# Patient Record
Sex: MALE | Race: WHITE | NOT HISPANIC OR LATINO | Employment: OTHER | URBAN - METROPOLITAN AREA
[De-identification: names, ages, dates, MRNs, and addresses within clinical notes are randomized per-mention and may not be internally consistent; named-entity substitution may affect disease eponyms.]

---

## 2024-09-27 ENCOUNTER — HOSPITAL ENCOUNTER (EMERGENCY)
Facility: HOSPITAL | Age: 64
Discharge: HOME | End: 2024-09-27
Payer: MEDICARE

## 2024-09-27 VITALS
HEART RATE: 56 BPM | DIASTOLIC BLOOD PRESSURE: 91 MMHG | SYSTOLIC BLOOD PRESSURE: 196 MMHG | TEMPERATURE: 98.2 F | RESPIRATION RATE: 13 BRPM | OXYGEN SATURATION: 97 %

## 2024-09-27 DIAGNOSIS — G51.0 BELL'S PALSY: Primary | ICD-10-CM

## 2024-09-27 LAB
ALBUMIN SERPL BCG-MCNC: 4 G/DL (ref 3.5–5)
ALP SERPL-CCNC: 101 U/L (ref 34–104)
ALT SERPL W P-5'-P-CCNC: 69 U/L (ref 7–52)
ANION GAP SERPL CALCULATED.3IONS-SCNC: 6 MMOL/L (ref 4–13)
AST SERPL W P-5'-P-CCNC: 76 U/L (ref 13–39)
ATRIAL RATE: 66 BPM
B BURGDOR IGG+IGM SER QL IA: NEGATIVE
BASOPHILS # BLD AUTO: 0.1 THOUSANDS/ΜL (ref 0–0.1)
BASOPHILS NFR BLD AUTO: 1 % (ref 0–1)
BILIRUB SERPL-MCNC: 0.98 MG/DL (ref 0.2–1)
BUN SERPL-MCNC: 23 MG/DL (ref 5–25)
CALCIUM SERPL-MCNC: 8.8 MG/DL (ref 8.4–10.2)
CHLORIDE SERPL-SCNC: 107 MMOL/L (ref 96–108)
CO2 SERPL-SCNC: 25 MMOL/L (ref 21–32)
CREAT SERPL-MCNC: 1.21 MG/DL (ref 0.6–1.3)
EOSINOPHIL # BLD AUTO: 0.25 THOUSAND/ΜL (ref 0–0.61)
EOSINOPHIL NFR BLD AUTO: 3 % (ref 0–6)
ERYTHROCYTE [DISTWIDTH] IN BLOOD BY AUTOMATED COUNT: 14.8 % (ref 11.6–15.1)
GFR SERPL CREATININE-BSD FRML MDRD: 62 ML/MIN/1.73SQ M
GLUCOSE SERPL-MCNC: 95 MG/DL (ref 65–140)
HCT VFR BLD AUTO: 47.3 % (ref 36.5–49.3)
HGB BLD-MCNC: 16.5 G/DL (ref 12–17)
IMM GRANULOCYTES # BLD AUTO: 0.02 THOUSAND/UL (ref 0–0.2)
IMM GRANULOCYTES NFR BLD AUTO: 0 % (ref 0–2)
LYMPHOCYTES # BLD AUTO: 2.76 THOUSANDS/ΜL (ref 0.6–4.47)
LYMPHOCYTES NFR BLD AUTO: 32 % (ref 14–44)
MCH RBC QN AUTO: 33.4 PG (ref 26.8–34.3)
MCHC RBC AUTO-ENTMCNC: 34.9 G/DL (ref 31.4–37.4)
MCV RBC AUTO: 96 FL (ref 82–98)
MONOCYTES # BLD AUTO: 0.96 THOUSAND/ΜL (ref 0.17–1.22)
MONOCYTES NFR BLD AUTO: 11 % (ref 4–12)
NEUTROPHILS # BLD AUTO: 4.45 THOUSANDS/ΜL (ref 1.85–7.62)
NEUTS SEG NFR BLD AUTO: 53 % (ref 43–75)
NRBC BLD AUTO-RTO: 0 /100 WBCS
P AXIS: 0 DEGREES
PLATELET # BLD AUTO: 145 THOUSANDS/UL (ref 149–390)
PMV BLD AUTO: 10.2 FL (ref 8.9–12.7)
POTASSIUM SERPL-SCNC: 3.8 MMOL/L (ref 3.5–5.3)
PR INTERVAL: 192 MS
PROT SERPL-MCNC: 7.8 G/DL (ref 6.4–8.4)
QRS AXIS: 81 DEGREES
QRSD INTERVAL: 76 MS
QT INTERVAL: 406 MS
QTC INTERVAL: 425 MS
RBC # BLD AUTO: 4.94 MILLION/UL (ref 3.88–5.62)
SODIUM SERPL-SCNC: 138 MMOL/L (ref 135–147)
T WAVE AXIS: 2 DEGREES
VENTRICULAR RATE: 66 BPM
WBC # BLD AUTO: 8.54 THOUSAND/UL (ref 4.31–10.16)

## 2024-09-27 PROCEDURE — 85025 COMPLETE CBC W/AUTO DIFF WBC: CPT | Performed by: NURSE PRACTITIONER

## 2024-09-27 PROCEDURE — 93005 ELECTROCARDIOGRAM TRACING: CPT

## 2024-09-27 PROCEDURE — 99284 EMERGENCY DEPT VISIT MOD MDM: CPT

## 2024-09-27 PROCEDURE — 93010 ELECTROCARDIOGRAM REPORT: CPT | Performed by: INTERNAL MEDICINE

## 2024-09-27 PROCEDURE — 99284 EMERGENCY DEPT VISIT MOD MDM: CPT | Performed by: NURSE PRACTITIONER

## 2024-09-27 PROCEDURE — 80053 COMPREHEN METABOLIC PANEL: CPT | Performed by: NURSE PRACTITIONER

## 2024-09-27 PROCEDURE — 36415 COLL VENOUS BLD VENIPUNCTURE: CPT | Performed by: NURSE PRACTITIONER

## 2024-09-27 PROCEDURE — 96374 THER/PROPH/DIAG INJ IV PUSH: CPT

## 2024-09-27 PROCEDURE — 86618 LYME DISEASE ANTIBODY: CPT | Performed by: NURSE PRACTITIONER

## 2024-09-27 RX ORDER — PREDNISONE 20 MG/1
60 TABLET ORAL DAILY
Qty: 21 TABLET | Refills: 0 | Status: SHIPPED | OUTPATIENT
Start: 2024-09-27 | End: 2024-10-04

## 2024-09-27 RX ORDER — METHYLPREDNISOLONE SODIUM SUCCINATE 125 MG/2ML
125 INJECTION, POWDER, LYOPHILIZED, FOR SOLUTION INTRAMUSCULAR; INTRAVENOUS ONCE
Status: COMPLETED | OUTPATIENT
Start: 2024-09-27 | End: 2024-09-27

## 2024-09-27 RX ADMIN — DEXTRAN 70, GLYCERIN, HYPROMELLOSE 1 DROP: 1; 2; 3 SOLUTION/ DROPS OPHTHALMIC at 10:57

## 2024-09-27 RX ADMIN — METHYLPREDNISOLONE SODIUM SUCCINATE 125 MG: 125 INJECTION, POWDER, FOR SOLUTION INTRAMUSCULAR; INTRAVENOUS at 10:31

## 2024-09-27 NOTE — ED PROVIDER NOTES
Final diagnoses:   Bell's palsy     ED Disposition       ED Disposition   Discharge    Condition   Stable    Date/Time   Fri Sep 27, 2024 11:13 AM    Comment   Leonidas Hutson discharge to home/self care.                   Assessment & Plan       Medical Decision Making  Symptoms consistent with Bell's palsy involving the brow and mouth.  No findings that would suggest CVA, no hemiparesis or weakness of the extremities.  No aphasia or visual deficits.  Significant paralysis recommend that he follow-up with ophthalmology to protect his eye and establish a primary care provider.  Counseled about hypertension recommend decreasing table salt to start.  Lyme titer pending will treat accordingly once resulted.    Amount and/or Complexity of Data Reviewed  Labs: ordered.    Risk  OTC drugs.  Prescription drug management.             Medications   methylPREDNISolone sodium succinate (Solu-MEDROL) injection 125 mg (125 mg Intravenous Given 9/27/24 1031)   Artificial Tears Op Soln 1 drop (1 drop Right Eye Given 9/27/24 1057)       ED Risk Strat Scores                                               History of Present Illness       Chief Complaint   Patient presents with    Facial Droop     Pt presents with facial droop and swelling for 2 days, no other neurological decifits per patient, no numbness or tingling, equal strength bilaterally, ambulatory to triage       History reviewed. No pertinent past medical history.   History reviewed. No pertinent surgical history.   History reviewed. No pertinent family history.   Social History     Tobacco Use    Smoking status: Every Day     Current packs/day: 0.50     Types: Cigarettes    Smokeless tobacco: Never      E-Cigarette/Vaping      E-Cigarette/Vaping Substances      I have reviewed and agree with the history as documented.     64-year-old male patient presenting here today with a chief complaint of facial paralysis.  He woke up about 2 days ago when symptoms were mild but they  have progressed to nearly complete paralysis of the right side of his face involving his forehead brow and mouth.  He has the inability to completely close his right eye.  He denies any fever or chills.  He denies any trauma.          Review of Systems   Constitutional:  Negative for chills and fever.   HENT:  Negative for ear pain and sore throat.    Eyes:  Negative for pain and visual disturbance.   Respiratory:  Negative for cough and shortness of breath.    Cardiovascular:  Negative for chest pain and palpitations.   Gastrointestinal:  Negative for abdominal pain and vomiting.   Genitourinary:  Negative for dysuria and hematuria.   Musculoskeletal:  Negative for arthralgias and back pain.   Skin:  Negative for color change and rash.   Neurological:  Positive for facial asymmetry. Negative for seizures and syncope.   All other systems reviewed and are negative.          Objective       ED Triage Vitals [09/27/24 1004]   Temperature Pulse Blood Pressure Respirations SpO2 Patient Position - Orthostatic VS   98.2 °F (36.8 °C) 68 (!) 258/123 16 95 % Sitting      Temp Source Heart Rate Source BP Location FiO2 (%) Pain Score    Oral Monitor Left arm -- --      Vitals      Date and Time Temp Pulse SpO2 Resp BP Pain Score FACES Pain Rating User   09/27/24 1045 -- 56 97 % 13 196/91 -- -- KM   09/27/24 1030 -- 59 97 % 9 227/106 -- -- RAP   09/27/24 1021 -- -- -- -- 240/114 -- -- [facility]   09/27/24 1004 98.2 °F (36.8 °C) 68 95 % 16 258/123 -- -- MS            Physical Exam  Vitals and nursing note reviewed.   Constitutional:       General: He is not in acute distress.     Appearance: He is well-developed.   HENT:      Head: Normocephalic and atraumatic.      Nose: No rhinorrhea.   Eyes:      General:         Right eye: No discharge.         Left eye: No discharge.      Extraocular Movements: EOM normal.      Conjunctiva/sclera: Conjunctivae normal.   Cardiovascular:      Rate and Rhythm: Normal rate.   Pulmonary:      Effort:  Pulmonary effort is normal. No respiratory distress.   Abdominal:      General: There is no distension.      Tenderness: There is no guarding.   Musculoskeletal:         General: No deformity or edema.      Cervical back: Normal range of motion and neck supple.   Skin:     General: Skin is warm and dry.      Coloration: Skin is not pale.   Neurological:      Mental Status: He is alert and oriented to person, place, and time.      Cranial Nerves: Facial asymmetry present.      Motor: No weakness.      Coordination: Coordination normal.      Comments: House-Brackmann score of V   Psychiatric:         Mood and Affect: Mood and affect and mood normal.         Results Reviewed       Procedure Component Value Units Date/Time    Comprehensive metabolic panel [[id number]]  (Abnormal) Collected: 09/27/24 1027    Lab Status: Final result Specimen: Blood from Arm, Right Updated: 09/27/24 1057     Sodium 138 mmol/L      Potassium 3.8 mmol/L      Chloride 107 mmol/L      CO2 25 mmol/L      ANION GAP 6 mmol/L      BUN 23 mg/dL      Creatinine 1.21 mg/dL      Glucose 95 mg/dL      Calcium 8.8 mg/dL      AST 76 U/L      ALT 69 U/L      Alkaline Phosphatase 101 U/L      Total Protein 7.8 g/dL      Albumin 4.0 g/dL      Total Bilirubin 0.98 mg/dL      eGFR 62 ml/min/1.73sq m     Narrative:      [employer] guidelines for Chronic Kidney Disease (CKD):     Stage 1 with normal or high GFR (GFR > 90 mL/min/1.73 square meters)    Stage 2 Mild CKD (GFR = 60-89 mL/min/1.73 square meters)    Stage 3A Moderate CKD (GFR = 45-59 mL/min/1.73 square meters)    Stage 3B Moderate CKD (GFR = 30-44 mL/min/1.73 square meters)    Stage 4 Severe CKD (GFR = 15-29 mL/min/1.73 square meters)    Stage 5 End Stage CKD (GFR <15 mL/min/1.73 square meters)  Note: GFR calculation is accurate only with a steady state creatinine    CBC and differential [[id number]]  (Abnormal) Collected: 09/27/24 1027    Lab Status: Final result Specimen:  Blood from Arm, Right Updated: 09/27/24 1038     WBC 8.54 Thousand/uL      RBC 4.94 Million/uL      Hemoglobin 16.5 g/dL      Hematocrit 47.3 %      MCV 96 fL      MCH 33.4 pg      MCHC 34.9 g/dL      RDW 14.8 %      MPV 10.2 fL      Platelets 145 Thousands/uL      nRBC 0 /100 WBCs      Segmented % 53 %      Immature Grans % 0 %      Lymphocytes % 32 %      Monocytes % 11 %      Eosinophils Relative 3 %      Basophils Relative 1 %      Absolute Neutrophils 4.45 Thousands/µL      Absolute Immature Grans 0.02 Thousand/uL      Absolute Lymphocytes 2.76 Thousands/µL      Absolute Monocytes 0.96 Thousand/µL      Eosinophils Absolute 0.25 Thousand/µL      Basophils Absolute 0.10 Thousands/µL     LYME TOTAL AB W REFLEX TO IGM/IGG [[id number]] Collected: 09/27/24 1027    Lab Status: In process Specimen: Blood from Arm, Right Updated: 09/27/24 1035    Narrative:      The following orders were created for panel order LYME TOTAL AB W REFLEX TO IGM/IGG.  Procedure                               Abnormality         Status                     ---------                               -----------         ------                     Lyme Total AB W Reflex t...[[id number]]                      In process                   Please view results for these tests on the individual orders.    Lyme Total AB W Reflex to IGM/IGG [[id number]] Collected: 09/27/24 1027    Lab Status: In process Specimen: Blood from Arm, Right Updated: 09/27/24 1035            No orders to display       Procedures    ED Medication and Procedure Management   None     Patient's Medications   Discharge Prescriptions    PREDNISONE 20 MG TABLET    Take 3 tablets (60 mg total) by mouth daily for 7 days       Start Date: 9/27/2024 End Date: 10/4/2024       Order Dose: 60 mg       Quantity: 21 tablet    Refills: 0     No discharge procedures on file.  ED SEPSIS DOCUMENTATION   Time reflects when diagnosis was documented in both MDM as applicable and the Disposition within this  note       Time User Action Codes Description Comment    9/27/2024 11:13 AM Victorino Stern Add [G51.0] Bell's palsy                  KIKA Borden  09/27/24 1124

## 2025-01-01 ENCOUNTER — APPOINTMENT (EMERGENCY)
Dept: CT IMAGING | Facility: HOSPITAL | Age: 65
DRG: 432 | End: 2025-01-01
Payer: MEDICARE

## 2025-01-01 ENCOUNTER — APPOINTMENT (INPATIENT)
Dept: NON INVASIVE DIAGNOSTICS | Facility: HOSPITAL | Age: 65
DRG: 432 | End: 2025-01-01
Attending: PHYSICIAN ASSISTANT
Payer: MEDICARE

## 2025-01-01 ENCOUNTER — APPOINTMENT (INPATIENT)
Dept: RADIOLOGY | Facility: HOSPITAL | Age: 65
DRG: 432 | End: 2025-01-01
Payer: MEDICARE

## 2025-01-01 ENCOUNTER — APPOINTMENT (INPATIENT)
Dept: CT IMAGING | Facility: HOSPITAL | Age: 65
DRG: 432 | End: 2025-01-01
Payer: MEDICARE

## 2025-01-01 ENCOUNTER — APPOINTMENT (INPATIENT)
Dept: NON INVASIVE DIAGNOSTICS | Facility: HOSPITAL | Age: 65
DRG: 432 | End: 2025-01-01
Payer: MEDICARE

## 2025-01-01 ENCOUNTER — APPOINTMENT (INPATIENT)
Dept: ULTRASOUND IMAGING | Facility: HOSPITAL | Age: 65
DRG: 432 | End: 2025-01-01
Payer: MEDICARE

## 2025-01-01 ENCOUNTER — HOSPITAL ENCOUNTER (INPATIENT)
Facility: HOSPITAL | Age: 65
LOS: 7 days | DRG: 432 | End: 2025-08-15
Attending: EMERGENCY MEDICINE | Admitting: FAMILY MEDICINE
Payer: MEDICARE

## 2025-01-01 ENCOUNTER — APPOINTMENT (EMERGENCY)
Dept: RADIOLOGY | Facility: HOSPITAL | Age: 65
DRG: 432 | End: 2025-01-01
Payer: MEDICARE

## 2025-08-08 PROBLEM — E87.20 ACIDOSIS: Status: ACTIVE | Noted: 2025-01-01

## 2025-08-08 PROBLEM — E72.20 HYPERAMMONEMIA (HCC): Status: ACTIVE | Noted: 2025-01-01

## 2025-08-08 PROBLEM — K74.60 CIRRHOSIS (HCC): Status: ACTIVE | Noted: 2025-01-01

## 2025-08-08 PROBLEM — R79.89 ELEVATED LFTS: Status: ACTIVE | Noted: 2025-01-01

## 2025-08-08 PROBLEM — N17.9 ACUTE KIDNEY INJURY (HCC): Status: ACTIVE | Noted: 2025-01-01

## 2025-08-08 PROBLEM — I44.1 HEART BLOCK AV SECOND DEGREE: Status: ACTIVE | Noted: 2025-01-01

## 2025-08-08 PROBLEM — R10.9 ABDOMINAL PAIN: Status: ACTIVE | Noted: 2025-01-01

## 2025-08-08 PROBLEM — F17.210 CIGARETTE NICOTINE DEPENDENCE: Status: ACTIVE | Noted: 2025-01-01

## 2025-08-08 PROBLEM — E87.1 HYPONATREMIA: Status: ACTIVE | Noted: 2025-01-01

## 2025-08-09 PROBLEM — K65.2 SBP (SPONTANEOUS BACTERIAL PERITONITIS) (HCC): Status: ACTIVE | Noted: 2025-01-01

## 2025-08-09 PROBLEM — E87.20 ACIDOSIS: Status: RESOLVED | Noted: 2025-01-01 | Resolved: 2025-01-01

## 2025-08-12 PROBLEM — D69.6 THROMBOCYTOPENIA (HCC): Status: ACTIVE | Noted: 2025-01-01

## 2025-08-12 PROBLEM — R59.9 ENLARGED LYMPH NODES: Status: ACTIVE | Noted: 2025-01-01

## 2025-08-12 PROBLEM — D58.9 HEMOLYTIC ANEMIA (HCC): Status: ACTIVE | Noted: 2025-01-01

## 2025-08-13 PROBLEM — Z51.5 PALLIATIVE CARE ENCOUNTER: Status: ACTIVE | Noted: 2025-01-01

## 2025-08-13 PROBLEM — Z71.89 GOALS OF CARE, COUNSELING/DISCUSSION: Status: ACTIVE | Noted: 2025-01-01

## 2025-08-14 PROBLEM — D68.9 COAGULOPATHY (HCC): Status: ACTIVE | Noted: 2025-01-01

## 2025-08-14 PROBLEM — E87.1 HYPONATREMIA: Status: RESOLVED | Noted: 2025-01-01 | Resolved: 2025-01-01

## 2025-08-14 PROBLEM — E87.29 METABOLIC ACIDOSIS, INCREASED ANION GAP: Status: ACTIVE | Noted: 2025-01-01

## 2025-08-14 PROBLEM — J96.01 ACUTE RESPIRATORY FAILURE WITH HYPOXIA (HCC): Status: ACTIVE | Noted: 2025-01-01

## 2025-08-14 PROBLEM — R59.9 ENLARGED LYMPH NODES: Status: RESOLVED | Noted: 2025-01-01 | Resolved: 2025-01-01

## 2025-08-14 PROBLEM — D58.9 HEMOLYTIC ANEMIA (HCC): Status: RESOLVED | Noted: 2025-01-01 | Resolved: 2025-01-01

## 2025-08-14 PROBLEM — Z51.5 COMFORT MEASURES ONLY STATUS: Status: ACTIVE | Noted: 2025-01-01

## 2025-08-14 PROBLEM — E87.20 ACIDOSIS: Status: RESOLVED | Noted: 2025-01-01 | Resolved: 2025-01-01

## 2025-08-14 PROBLEM — K65.2 SBP (SPONTANEOUS BACTERIAL PERITONITIS) (HCC): Status: RESOLVED | Noted: 2025-01-01 | Resolved: 2025-01-01

## 2025-08-14 PROBLEM — K76.82 HEPATIC ENCEPHALOPATHY (HCC): Status: ACTIVE | Noted: 2025-01-01

## 2025-08-14 PROBLEM — R10.9 ABDOMINAL PAIN: Status: RESOLVED | Noted: 2025-01-01 | Resolved: 2025-01-01
